# Patient Record
Sex: FEMALE | Race: WHITE | NOT HISPANIC OR LATINO | Employment: STUDENT | URBAN - METROPOLITAN AREA
[De-identification: names, ages, dates, MRNs, and addresses within clinical notes are randomized per-mention and may not be internally consistent; named-entity substitution may affect disease eponyms.]

---
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
## 2023-01-10 ENCOUNTER — PATIENT MESSAGE (OUTPATIENT)
Dept: OTOLARYNGOLOGY | Facility: CLINIC | Age: 18
End: 2023-01-10
Payer: COMMERCIAL

## 2023-05-15 ENCOUNTER — PATIENT MESSAGE (OUTPATIENT)
Dept: OBSTETRICS AND GYNECOLOGY | Facility: CLINIC | Age: 18
End: 2023-05-15

## 2023-09-06 ENCOUNTER — PATIENT MESSAGE (OUTPATIENT)
Dept: OBSTETRICS AND GYNECOLOGY | Facility: CLINIC | Age: 18
End: 2023-09-06
Payer: COMMERCIAL

## 2023-09-06 DIAGNOSIS — N92.0 MENORRHAGIA WITH REGULAR CYCLE: ICD-10-CM

## 2023-09-06 RX ORDER — ETHYNODIOL DIACETATE AND ETHINYL ESTRADIOL 1 MG-50MCG
1 KIT ORAL DAILY
Qty: 84 TABLET | Refills: 3 | Status: CANCELLED | OUTPATIENT
Start: 2023-09-06 | End: 2024-09-05

## 2023-09-07 RX ORDER — ETHYNODIOL DIACETATE AND ETHINYL ESTRADIOL 1 MG-50MCG
1 KIT ORAL DAILY
Qty: 84 TABLET | Refills: 3 | Status: SHIPPED | OUTPATIENT
Start: 2023-09-07 | End: 2023-09-11 | Stop reason: SDUPTHER

## 2023-09-11 ENCOUNTER — OFFICE VISIT (OUTPATIENT)
Dept: OBSTETRICS AND GYNECOLOGY | Facility: CLINIC | Age: 18
End: 2023-09-11
Payer: COMMERCIAL

## 2023-09-11 VITALS
SYSTOLIC BLOOD PRESSURE: 110 MMHG | WEIGHT: 122.13 LBS | DIASTOLIC BLOOD PRESSURE: 70 MMHG | HEIGHT: 63 IN | BODY MASS INDEX: 21.64 KG/M2

## 2023-09-11 DIAGNOSIS — N92.0 MENORRHAGIA WITH REGULAR CYCLE: ICD-10-CM

## 2023-09-11 DIAGNOSIS — Z11.3 SCREEN FOR STD (SEXUALLY TRANSMITTED DISEASE): Primary | ICD-10-CM

## 2023-09-11 PROCEDURE — 99213 OFFICE O/P EST LOW 20 MIN: CPT | Mod: S$GLB,,, | Performed by: OBSTETRICS & GYNECOLOGY

## 2023-09-11 PROCEDURE — 3074F SYST BP LT 130 MM HG: CPT | Mod: CPTII,S$GLB,, | Performed by: OBSTETRICS & GYNECOLOGY

## 2023-09-11 PROCEDURE — 1160F PR REVIEW ALL MEDS BY PRESCRIBER/CLIN PHARMACIST DOCUMENTED: ICD-10-PCS | Mod: CPTII,S$GLB,, | Performed by: OBSTETRICS & GYNECOLOGY

## 2023-09-11 PROCEDURE — 3078F PR MOST RECENT DIASTOLIC BLOOD PRESSURE < 80 MM HG: ICD-10-PCS | Mod: CPTII,S$GLB,, | Performed by: OBSTETRICS & GYNECOLOGY

## 2023-09-11 PROCEDURE — 99213 PR OFFICE/OUTPT VISIT, EST, LEVL III, 20-29 MIN: ICD-10-PCS | Mod: S$GLB,,, | Performed by: OBSTETRICS & GYNECOLOGY

## 2023-09-11 PROCEDURE — 87591 N.GONORRHOEAE DNA AMP PROB: CPT | Performed by: OBSTETRICS & GYNECOLOGY

## 2023-09-11 PROCEDURE — 1160F RVW MEDS BY RX/DR IN RCRD: CPT | Mod: CPTII,S$GLB,, | Performed by: OBSTETRICS & GYNECOLOGY

## 2023-09-11 PROCEDURE — 99999 PR PBB SHADOW E&M-EST. PATIENT-LVL III: ICD-10-PCS | Mod: PBBFAC,,, | Performed by: OBSTETRICS & GYNECOLOGY

## 2023-09-11 PROCEDURE — 3078F DIAST BP <80 MM HG: CPT | Mod: CPTII,S$GLB,, | Performed by: OBSTETRICS & GYNECOLOGY

## 2023-09-11 PROCEDURE — 99999 PR PBB SHADOW E&M-EST. PATIENT-LVL III: CPT | Mod: PBBFAC,,, | Performed by: OBSTETRICS & GYNECOLOGY

## 2023-09-11 PROCEDURE — 3074F PR MOST RECENT SYSTOLIC BLOOD PRESSURE < 130 MM HG: ICD-10-PCS | Mod: CPTII,S$GLB,, | Performed by: OBSTETRICS & GYNECOLOGY

## 2023-09-11 PROCEDURE — 1159F PR MEDICATION LIST DOCUMENTED IN MEDICAL RECORD: ICD-10-PCS | Mod: CPTII,S$GLB,, | Performed by: OBSTETRICS & GYNECOLOGY

## 2023-09-11 PROCEDURE — 3008F PR BODY MASS INDEX (BMI) DOCUMENTED: ICD-10-PCS | Mod: CPTII,S$GLB,, | Performed by: OBSTETRICS & GYNECOLOGY

## 2023-09-11 PROCEDURE — 1159F MED LIST DOCD IN RCRD: CPT | Mod: CPTII,S$GLB,, | Performed by: OBSTETRICS & GYNECOLOGY

## 2023-09-11 PROCEDURE — 3008F BODY MASS INDEX DOCD: CPT | Mod: CPTII,S$GLB,, | Performed by: OBSTETRICS & GYNECOLOGY

## 2023-09-11 RX ORDER — ETHYNODIOL DIACETATE AND ETHINYL ESTRADIOL 1 MG-50MCG
1 KIT ORAL DAILY
Qty: 84 TABLET | Refills: 3 | Status: SHIPPED | OUTPATIENT
Start: 2023-09-11 | End: 2024-09-10

## 2023-09-11 NOTE — PROGRESS NOTES
"Chief Complaint   Patient presents with    Well Woman       History of Present Illness   18 y.o.  female   patient presents today for Birth control refill, no contraindications, no complaints, no bowel or bladder complaints.     Past medical and surgical history reviewed.   I have reviewed the patient's medical history in detail and updated the computerized patient record.    Review of patient's allergies indicates:   Allergen Reactions    Cephalosporins Hives         Review of Systems -   GEN ROS:   Breast ROS:   Genito-Urinary ROS:       Physical Examination:  /70   Ht 5' 3" (1.6 m)   Wt 55.4 kg (122 lb 2.2 oz)   LMP 2023 (Approximate)   BMI 21.64 kg/m²    deferred      Assessment:  1. Screen for STD (sexually transmitted disease)  C. trachomatis/N. gonorrhoeae by AMP DNA [facility]; Urine      2. Menorrhagia with regular cycle  ethynodiol-ethinyl estradiol (KELNOR 1-50, 28,) 1-50 mg-mcg per tablet          Plan:  PAP next year, refill oral contraceptive pills - no contraindications  Patient informed will be contacted with results within 2 weeks. Encouraged to please call back or email if she has not heard from us by then.          "

## 2023-09-12 LAB
C TRACH DNA SPEC QL NAA+PROBE: NOT DETECTED
N GONORRHOEA DNA SPEC QL NAA+PROBE: NOT DETECTED

## 2024-05-13 ENCOUNTER — OFFICE VISIT (OUTPATIENT)
Dept: OTOLARYNGOLOGY | Facility: CLINIC | Age: 19
End: 2024-05-13
Payer: COMMERCIAL

## 2024-05-13 VITALS — BODY MASS INDEX: 22.54 KG/M2 | HEIGHT: 63 IN | WEIGHT: 127.19 LBS

## 2024-05-13 DIAGNOSIS — T70.0XXS OTITIC BAROTRAUMA, SEQUELA: ICD-10-CM

## 2024-05-13 DIAGNOSIS — H69.03 PATULOUS EUSTACHIAN TUBE OF BOTH EARS: Primary | ICD-10-CM

## 2024-05-13 PROCEDURE — 1159F MED LIST DOCD IN RCRD: CPT | Mod: CPTII,S$GLB,, | Performed by: OTOLARYNGOLOGY

## 2024-05-13 PROCEDURE — 99999 PR PBB SHADOW E&M-EST. PATIENT-LVL III: CPT | Mod: PBBFAC,,, | Performed by: OTOLARYNGOLOGY

## 2024-05-13 PROCEDURE — 3008F BODY MASS INDEX DOCD: CPT | Mod: CPTII,S$GLB,, | Performed by: OTOLARYNGOLOGY

## 2024-05-13 PROCEDURE — 99214 OFFICE O/P EST MOD 30 MIN: CPT | Mod: S$GLB,,, | Performed by: OTOLARYNGOLOGY

## 2024-05-13 PROCEDURE — 1160F RVW MEDS BY RX/DR IN RCRD: CPT | Mod: CPTII,S$GLB,, | Performed by: OTOLARYNGOLOGY

## 2024-05-13 NOTE — PROGRESS NOTES
Subjective:       Patient ID: Albina Dumont is a 18 y.o. female.    Chief Complaint: Ear Fullness    Albina is here for follow-up.   Here today for ET issues. She has had both Patulous and obstructive ET in the past. She continues to have bothersome autophony / fullness. This is intermittent but at times seems to worsen for extended periods. No otorrhea. She also has problems with flying - extreme pain and pressure. Has not taken any prophylactic.   Last visit 8/2022      Patient validated questionnaires (if applicable):      %            No data to display                   No data to display                   No data to display                     Review of Systems   Constitutional: Negative for activity change and appetite change.   Respiratory: Negative for difficulty breathing and wheezing   Cardiovascular: Negative for chest pain.      Objective:        Constitutional:   Vital signs are normal. She appears well-developed and well-nourished.     Head:  Normocephalic and atraumatic.     Ears:  Hearing normal to normal and whispered voice; external ear normal without scars, lesions, or masses; ear canal, tympanic membrane, and middle ear normal..   Excursion of posterior tympanic membrane L>R with nasal breathing.     Nose:  Nose normal including turbinates, nasal mucosa, sinuses and nasal septum.     Mouth/Throat  Oropharynx clear and moist without lesions or asymmetry.     Neck:  Neck normal without thyromegaly masses, asymmetry, normal tracheal structure, crepitus, and tenderness.         Tests / Results:  none    Assessment:       1. Patulous eustachian tube of both ears    2. Otitic barotrauma, sequela          Plan:       Discussed primarily Patulous ET with what seems to be obstruction ETD with barometric changes.   When flying, discussed decongestant prior to flight.  Otherwise, when not flying, avoid decongestants / flonase / AH unless needed for nasal symptoms. We discussed TM weighing to see if  improved symptoms.   Topical lidocaine applied to left TM, followed by small mastisol coated steri strip to weigh down the posterior tympanic membrane. Left right alone so we could trend R vs. L.  She will update me with progress

## 2024-05-18 ENCOUNTER — PATIENT MESSAGE (OUTPATIENT)
Dept: OTOLARYNGOLOGY | Facility: CLINIC | Age: 19
End: 2024-05-18
Payer: COMMERCIAL

## 2024-07-10 DIAGNOSIS — M25.511 BILATERAL SHOULDER PAIN: Primary | ICD-10-CM

## 2024-07-10 DIAGNOSIS — M25.512 BILATERAL SHOULDER PAIN: Primary | ICD-10-CM

## 2024-07-11 ENCOUNTER — HOSPITAL ENCOUNTER (OUTPATIENT)
Dept: RADIOLOGY | Facility: HOSPITAL | Age: 19
Discharge: HOME | End: 2024-07-11
Attending: ORTHOPAEDIC SURGERY
Payer: COMMERCIAL

## 2024-07-11 ENCOUNTER — OFFICE VISIT (OUTPATIENT)
Dept: ORTHOPEDICS | Facility: CLINIC | Age: 19
End: 2024-07-11
Payer: COMMERCIAL

## 2024-07-11 VITALS — HEIGHT: 63 IN | BODY MASS INDEX: 22.15 KG/M2 | WEIGHT: 125 LBS

## 2024-07-11 DIAGNOSIS — M25.512 BILATERAL SHOULDER PAIN, UNSPECIFIED CHRONICITY: Primary | ICD-10-CM

## 2024-07-11 DIAGNOSIS — M25.511 BILATERAL SHOULDER PAIN, UNSPECIFIED CHRONICITY: Primary | ICD-10-CM

## 2024-07-11 DIAGNOSIS — M25.511 BILATERAL SHOULDER PAIN: ICD-10-CM

## 2024-07-11 DIAGNOSIS — M25.512 BILATERAL SHOULDER PAIN: ICD-10-CM

## 2024-07-11 PROCEDURE — 1159F MED LIST DOCD IN RCRD: CPT | Mod: CPTII,S$GLB,, | Performed by: ORTHOPAEDIC SURGERY

## 2024-07-11 PROCEDURE — 99203 OFFICE O/P NEW LOW 30 MIN: CPT | Mod: S$GLB,,, | Performed by: ORTHOPAEDIC SURGERY

## 2024-07-11 PROCEDURE — 3008F BODY MASS INDEX DOCD: CPT | Mod: CPTII,S$GLB,, | Performed by: ORTHOPAEDIC SURGERY

## 2024-07-11 PROCEDURE — 73030 X-RAY EXAM OF SHOULDER: CPT | Mod: 26,50,, | Performed by: RADIOLOGY

## 2024-07-11 PROCEDURE — 73030 X-RAY EXAM OF SHOULDER: CPT | Mod: TC,50,PO

## 2024-07-11 PROCEDURE — 99999 PR PBB SHADOW E&M-EST. PATIENT-LVL II: CPT | Mod: PBBFAC,,, | Performed by: ORTHOPAEDIC SURGERY

## 2024-07-11 NOTE — PROGRESS NOTES
18 years old left greater than right shoulder pain for at least 6 months time no trauma throbbing pain 6 on good days 7 on bad days.  Difficulty put her arm up overhead reaching behind her back also pain nighttime she sleeps on the side.  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

## 2024-07-17 ENCOUNTER — PATIENT MESSAGE (OUTPATIENT)
Dept: ORTHOPEDICS | Facility: CLINIC | Age: 19
End: 2024-07-17
Payer: COMMERCIAL

## 2024-07-18 DIAGNOSIS — F41.9 ANXIETY: Primary | ICD-10-CM

## 2024-07-18 RX ORDER — DIAZEPAM 5 MG/1
5 TABLET ORAL EVERY 6 HOURS PRN
Qty: 2 TABLET | Refills: 0 | Status: SHIPPED | OUTPATIENT
Start: 2024-07-18 | End: 2024-08-17

## 2024-07-22 ENCOUNTER — PATIENT MESSAGE (OUTPATIENT)
Dept: ORTHOPEDICS | Facility: CLINIC | Age: 19
End: 2024-07-22
Payer: COMMERCIAL

## 2024-07-29 ENCOUNTER — PATIENT MESSAGE (OUTPATIENT)
Dept: ORTHOPEDICS | Facility: CLINIC | Age: 19
End: 2024-07-29
Payer: COMMERCIAL

## 2024-07-31 ENCOUNTER — PATIENT MESSAGE (OUTPATIENT)
Dept: ORTHOPEDICS | Facility: CLINIC | Age: 19
End: 2024-07-31
Payer: COMMERCIAL

## 2024-07-31 DIAGNOSIS — M25.511 BILATERAL SHOULDER PAIN: Primary | ICD-10-CM

## 2024-07-31 DIAGNOSIS — M25.512 BILATERAL SHOULDER PAIN: Primary | ICD-10-CM

## 2024-07-31 RX ORDER — CELECOXIB 200 MG/1
200 CAPSULE ORAL DAILY
Qty: 30 CAPSULE | Refills: 0 | Status: SHIPPED | OUTPATIENT
Start: 2024-07-31

## 2024-09-18 ENCOUNTER — OFFICE VISIT (OUTPATIENT)
Dept: OBSTETRICS AND GYNECOLOGY | Facility: CLINIC | Age: 19
End: 2024-09-18
Payer: COMMERCIAL

## 2024-09-18 VITALS
DIASTOLIC BLOOD PRESSURE: 78 MMHG | WEIGHT: 130.31 LBS | SYSTOLIC BLOOD PRESSURE: 124 MMHG | HEIGHT: 63 IN | BODY MASS INDEX: 23.09 KG/M2

## 2024-09-18 DIAGNOSIS — Z01.419 GYNECOLOGIC EXAM NORMAL: Primary | ICD-10-CM

## 2024-09-18 DIAGNOSIS — N92.0 MENORRHAGIA WITH REGULAR CYCLE: ICD-10-CM

## 2024-09-18 PROCEDURE — 3074F SYST BP LT 130 MM HG: CPT | Mod: CPTII,S$GLB,, | Performed by: OBSTETRICS & GYNECOLOGY

## 2024-09-18 PROCEDURE — 3008F BODY MASS INDEX DOCD: CPT | Mod: CPTII,S$GLB,, | Performed by: OBSTETRICS & GYNECOLOGY

## 2024-09-18 PROCEDURE — 99395 PREV VISIT EST AGE 18-39: CPT | Mod: S$GLB,,, | Performed by: OBSTETRICS & GYNECOLOGY

## 2024-09-18 PROCEDURE — 3078F DIAST BP <80 MM HG: CPT | Mod: CPTII,S$GLB,, | Performed by: OBSTETRICS & GYNECOLOGY

## 2024-09-18 PROCEDURE — 99999 PR PBB SHADOW E&M-EST. PATIENT-LVL III: CPT | Mod: PBBFAC,,, | Performed by: OBSTETRICS & GYNECOLOGY

## 2024-09-18 PROCEDURE — 1159F MED LIST DOCD IN RCRD: CPT | Mod: CPTII,S$GLB,, | Performed by: OBSTETRICS & GYNECOLOGY

## 2024-09-18 RX ORDER — ETHYNODIOL DIACETATE AND ETHINYL ESTRADIOL 1 MG-50MCG
1 KIT ORAL DAILY
Qty: 84 TABLET | Refills: 3 | Status: SHIPPED | OUTPATIENT
Start: 2024-09-18 | End: 2025-09-18

## 2024-09-18 NOTE — PROGRESS NOTES
Chief Complaint   Patient presents with    Well Woman       History and Physical:  Patient's last menstrual period was 2024 (approximate).       Albina Dumont is a 19 y.o.   female who presents today for her routine annual GYN exam. The patient has no Gynecology complaints today. No bowel or bladder complaints.       Allergies:   Review of patient's allergies indicates:   Allergen Reactions    Cephalosporins Hives       Past Medical History:   Diagnosis Date    Seasonal allergies        Past Surgical History:   Procedure Laterality Date    MOLE REMOVAL      left thigh    TONSILLECTOMY      TYMPANOSTOMY TUBE PLACEMENT      bilateral       MEDS:   Current Outpatient Medications on File Prior to Visit   Medication Sig Dispense Refill    ethynodiol-ethinyl estradiol (KELNOR 1-50, 28,) 1-50 mg-mcg per tablet Take 1 tablet by mouth once daily. 84 tablet 3    celecoxib (CELEBREX) 200 MG capsule Take 1 capsule (200 mg total) by mouth once daily. (Patient not taking: Reported on 2024) 30 capsule 0    diazePAM (VALIUM) 5 MG tablet Take 30 minutes before MRI appointment   Don't operate heavy machinery while taking this medication. (Patient not taking: Reported on 2024) 2 tablet 0     No current facility-administered medications on file prior to visit.       OB History          0    Para   0    Term   0       0    AB   0    Living   0         SAB   0    IAB   0    Ectopic   0    Multiple   0    Live Births   0                 Social History     Socioeconomic History    Marital status: Single   Tobacco Use    Smoking status: Never    Smokeless tobacco: Never   Substance and Sexual Activity    Alcohol use: No    Drug use: No    Sexual activity: Never     Social Determinants of Health     Financial Resource Strain: Low Risk  (2024)    Overall Financial Resource Strain (CARDIA)     Difficulty of Paying Living Expenses: Not hard at all   Food Insecurity: No Food Insecurity  "(7/28/2024)    Hunger Vital Sign     Worried About Running Out of Food in the Last Year: Never true     Ran Out of Food in the Last Year: Never true   Transportation Needs: No Transportation Needs (5/12/2024)    PRAPARE - Transportation     Lack of Transportation (Medical): No     Lack of Transportation (Non-Medical): No   Physical Activity: Insufficiently Active (7/28/2024)    Exercise Vital Sign     Days of Exercise per Week: 2 days     Minutes of Exercise per Session: 20 min   Stress: No Stress Concern Present (7/28/2024)    [employer] [place] [employer] - Occupational Stress Questionnaire     Feeling of Stress : Only a little   Housing Stability: Unknown (7/28/2024)    Housing Stability Vital Sign     Unable to Pay for Housing in the Last Year: No       No family history on file.      Past medical and surgical history reviewed.   I have reviewed the patient's medical history in detail and updated the computerized patient record.    Review of System:   General: no chills, fever, night sweats, weight gain or weight loss  Psychological: no depression or suicidal ideation  Breasts: no new or changing breast lumps, nipple discharge or masses.  Respiratory: no cough, shortness of breath, or wheezing  Cardiovascular: no chest pain or dyspnea on exertion  Gastrointestinal: no abdominal pain, change in bowel habits, or black or bloody stools  Genito-Urinary: no incontinence, urinary frequency/urgency or vulvar/vaginal symptoms, pelvic pain or abnormal vaginal bleeding.  Musculoskeletal: no gait disturbance or muscular weakness      Physical Exam:   /78   Ht 5' 3" (1.6 m)   Wt 59.1 kg (130 lb 4.7 oz)   LMP 09/03/2024 (Approximate)   BMI 23.08 kg/m²   Constitutional: She appears alert and responsive. She appears well-developed, well-groomed, and well-nourished. No distress. Thin  HENT:   Head: Normocephalic and atraumatic.   Eyes: Conjunctivae and EOM are normal. No scleral icterus.   Neck: " Symmetrical. Normal range of motion. Neck supple. No tracheal deviation present.   Cardiovascular: Normal rate, no rhythm abnormality noted. Extremities without swelling or edema, warm.    Pulmonary/Chest: Normal respiratory Effort. No distress or retractions. She exhibits no tenderness.  Breasts: are symmetrical.   Right breast exhibits no inverted nipple, no mass, no nipple discharge, no skin change and no tenderness.   Left breast exhibits no inverted nipple, no mass, no nipple discharge, no skin change and no tenderness.  Abdominal: Soft. She exhibits no distension, hernias or masses. There is no tenderness. No enlargement of liver edge or spleen.  There is no rebound and no guarding.   Genitourinary:    External rectal exam shows no thrombosed external hemorrhoids, no lesions.     Pelvic exam was performed with patient supine.   No labial fusion, and symmetrical.    There is no rash, lesion or injury on the right labia.   There is no rash, lesion or injury on the left labia.   No bleeding and no signs of injury around the vaginal introitus, urethral meatus is normal size and without prolapse or lesions, urethra well supported. The cervix is visualized with no discharge, lesions or friability.   No vaginal discharge found.    No significant Cystocele, Enterocele or rectocele, and cervix and uterus well supported.   Bimanual exam:   The urethra is normal to palpation and there are no palpable vaginal wall masses.   Uterus is not deviated, not enlarged, not fixed, normal shape and not tender.   Cervix exhibits no motion tenderness.    Right adnexum displays no mass or nodularity and no tenderness.   Left adnexum displays no mass or nodularity and no tenderness.  Musculoskeletal: Normal range of motion.   Neurological: She is alert and oriented to person, place, and time. Coordination normal.   Skin: Skin is warm and dry. She is not diaphoretic. No rashes, lesions or ulcers.   Psychiatric: She has a normal mood and  affect, oriented to person, place, and time.      Assessment:   Normal annual GYN exam  1. Gynecologic exam normal  Liquid-Based Pap Smear, Screening    HPV High Risk Genotypes, PCR          Plan:   PAP  Refill oral contraceptive pills   Follow up in 1 year.  Patient informed will be contacted with results within 2 weeks. Encouraged to please call back or email if she has not heard from us by then.

## 2024-09-28 ENCOUNTER — PATIENT MESSAGE (OUTPATIENT)
Dept: OBSTETRICS AND GYNECOLOGY | Facility: CLINIC | Age: 19
End: 2024-09-28
Payer: COMMERCIAL

## 2025-01-03 ENCOUNTER — OFFICE VISIT (OUTPATIENT)
Dept: ORTHOPEDICS | Facility: CLINIC | Age: 20
End: 2025-01-03
Payer: COMMERCIAL

## 2025-01-03 DIAGNOSIS — M25.511 BILATERAL SHOULDER PAIN, UNSPECIFIED CHRONICITY: Primary | ICD-10-CM

## 2025-01-03 DIAGNOSIS — M25.512 BILATERAL SHOULDER PAIN, UNSPECIFIED CHRONICITY: Primary | ICD-10-CM

## 2025-01-03 DIAGNOSIS — M25.312 INSTABILITY OF BOTH SHOULDER JOINTS: ICD-10-CM

## 2025-01-03 DIAGNOSIS — M25.311 INSTABILITY OF BOTH SHOULDER JOINTS: ICD-10-CM

## 2025-01-03 PROBLEM — Z00.01 ABNORMAL WELLNESS EXAM: Status: ACTIVE | Noted: 2025-01-03

## 2025-01-03 PROBLEM — J00 ACUTE NASOPHARYNGITIS: Status: ACTIVE | Noted: 2025-01-03

## 2025-01-03 PROCEDURE — 99999 PR PBB SHADOW E&M-EST. PATIENT-LVL III: CPT | Mod: PBBFAC,,, | Performed by: ORTHOPAEDIC SURGERY

## 2025-01-03 NOTE — PROGRESS NOTES
19 years old follow-up bilateral shoulder pain.  Recently had an MRI outlying facility which shows some signal changes in the labrum, rotator cuff intact    At this point she is still remains symptomatic.  We discussed with her Kenalog injection to provide some immediate relief with physical therapy focusing on strengthening of the dynamic stabilizers of the shoulder as the primary form of treatment

## 2025-01-06 PROBLEM — E78.2 MIXED HYPERLIPIDEMIA: Status: ACTIVE | Noted: 2025-01-06

## 2025-01-07 ENCOUNTER — CLINICAL SUPPORT (OUTPATIENT)
Dept: REHABILITATION | Facility: HOSPITAL | Age: 20
End: 2025-01-07
Payer: COMMERCIAL

## 2025-01-07 DIAGNOSIS — M25.511 BILATERAL SHOULDER PAIN, UNSPECIFIED CHRONICITY: ICD-10-CM

## 2025-01-07 DIAGNOSIS — M25.512 BILATERAL SHOULDER PAIN, UNSPECIFIED CHRONICITY: ICD-10-CM

## 2025-01-07 DIAGNOSIS — R29.898 SHOULDER WEAKNESS: Primary | ICD-10-CM

## 2025-01-07 DIAGNOSIS — M25.312 INSTABILITY OF BOTH SHOULDER JOINTS: ICD-10-CM

## 2025-01-07 DIAGNOSIS — M25.311 INSTABILITY OF BOTH SHOULDER JOINTS: ICD-10-CM

## 2025-01-07 PROCEDURE — 97110 THERAPEUTIC EXERCISES: CPT | Mod: PO

## 2025-01-07 PROCEDURE — 97161 PT EVAL LOW COMPLEX 20 MIN: CPT | Mod: PO

## 2025-01-07 NOTE — PLAN OF CARE
[facility]   Physical Therapy Initial Evaluation      Date: 1/7/2025   Name: Albina Dumont  Clinic Number: [id number]    Therapy Diagnosis:  Encounter Diagnoses   Name Primary?    Bilateral shoulder pain, unspecified chronicity     Instability of both shoulder joints     Shoulder weakness Yes      Physician: Eddie Ayala MD     Physician Orders: PT Eval and Treat  Medical Diagnosis from Referral: Bilateral shoulder pain, unspecified chronicity [M25.511, M25.512], Instability of both shoulder joints [M25.311, M25.312]   Evaluation Date: 1/7/2025  Authorization Period Expiration: 01/03/2026   Plan of Care Expiration: 3/7/2025  Progress Note Due: 2/7/2025  Visit # / Visits authorized: 1/ 1   FOTO: 1/ 3     Precautions: Standard    Time In: 11:00 am  Time Out: 12:00 pm  Total Billable Time (timed & untimed codes): 60 minutes    SUBJECTIVE   Date of onset: 1 year ago    History of current condition - Albina reports: She has been dealing with B shoulder pain R>L for about a year now. Pt denies any ROHINI, states the pain developed gradually. Pain is worse in the morning and with overhead activities. Pt reports occasional clicking in the shoulders bilaterally. Pt played soccer, cheer, and track in high school but never had any issues. Pt denies any neck pain or radiating arm pain. 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 MRI showed signal changes in labrum.    Imaging: MRI B shoulders (see chart)    Pain:  Current 2/10, worst 6/10, best 1/10   Location: B superior shoulders   Description: Aching  Aggravating Factors: Lifting, reaching behind back   Easing Factors: pain medication    Prior Therapy: N/A  Social History: Pt lives with their family  Occupation: [employer] student   Prior Level of Function: Independent and pain free with all ADL, IADL, community mobility and functional activities.   Current Level of Function: Patient experiences Moderate difficulty with  participation in their typical ADL's.     Dominant Extremity: right    Pts goals: Pt reported goals are to decrease overall pain levels in order to return to prior functional level.       Medical History:   Past Medical History:   Diagnosis Date    Seasonal allergies        Surgical History:   Albina Dumont  has a past surgical history that includes Tonsillectomy; Tympanostomy tube placement; and Mole removal.    Medications:   Albina has a current medication list which includes the following prescription(s): ethynodiol-ethinyl estradiol.    Allergies:   Review of patient's allergies indicates:   Allergen Reactions    Cephalosporins Hives        OBJECTIVE     RANGE OF MOTION:  *denotes pain     Cervical ROM  (Degrees) Right    Left    Goal   Cervical Flexion  60 60   Cervical Extension  60 60   Cervical Side Bending  45 45 45   Cervical Rotation  90 90 90       Shoulder ROM  (Degrees) Right Left Goal   Shoulder Flexion 130*/180 140/180 160   Shoulder Extension  60/60 60/60 60   Shoulder Abduction  120*/180 130/180 160   Shoulder ER  90/100 90/100 90   Shoulder IR  70/75 70/75 70     Joint Mobility: Increased anterior glide bilaterally    Mobility:   Lat - R: + L: -  Horizontal ADD - R: + L: -     STRENGTH:  *denotes pain     MMT Right Left Goal   Shoulder Flexion 3+/5* 3+/5* 5/5   Shoulder Abduction 3+/5* 3+/5* 5/5   Shoulder IR 5/5 5/5 5/5   Shoulder ER 4-/5* 4-/5* 5/5   Rhomboid 5/5 5/5 5/5   Lower Trapezius  3/5* 3/5* 5/5   Middle Trapezius 3/5* 3/5* 5/5   Elbow Flexion  5/5 5/5 5/5   Elbow Extension 5/5 5/5 5/5   Wrist Flexion 5/5 5/5 5/5    Wrist Extension 5/5 5/5 5/5         SPECIAL TESTS:     Right  (spine) Left    HK Positive Negative   Active compression  Positive Positive   Apprehension  Positive for pain Positive for pain   Job's relocation Positive Negative   Dynamic speeds Negative Negative       Initial Observation: Depressed R shoulder, decreased R upward rotation     Sensation:  Sensation is intact  to light touch in B upper extremities      FUNCTION:     Intake Outcome Measure for FOTO Shoulder Survey    Therapist reviewed FOTO scores for Albina Dumont on 1/7/2025.   FOTO report - see Media section or FOTO account episode details.    Intake Score: 51%         TREATMENT         MANUAL THERAPY TECHNIQUES to improve pain, ROM for (00) minutes including:  Intervention Performed Today                                 THERAPEUTIC EXERCISES to develop strength, endurance, ROM, flexibility, posture, and core stabilization for (20) minutes including:  Intervention Performed Today     90/90 IR walkout x X10 ea. red   Wall slide x X10    Ulnar nerve glide x X15 ea.    carry x X2 laps 5lb         PATIENT EDUCATION AND HOME EXERCISES     Education provided: (5) minutes  HEP    Written Home Exercises Provided: {Exercises were reviewed and Albina was able to demonstrate them prior to the end of the session.  Albina demonstrated good  understanding of the education provided. See EMR under Patient Instructions for exercises provided during therapy sessions.    ASSESSMENT   Albina is a 19 y.o. female referred to outpatient Physical Therapy with a medical diagnosis of Bilateral shoulder pain, unspecified chronicity [M25.511, M25.512], Instability of both shoulder joints [M25.311, M25.312] . Pt presents with impairments including: impairments list: ROM, strength, endurance, joint mobility, muscle length, posture, and functional movement patterns.    Pt prognosis is Good.   Pt will benefit from skilled outpatient Physical Therapy to address the deficits stated above and in the chart below, provide pt/family education, and to maximize pt's level of independence.     Plan of care discussed with patient: Yes  Pt's spiritual, cultural and educational needs considered and patient is agreeable to the plan of care and goals as stated below:     Anticipated Barriers for therapy: occupation    Medical Necessity is demonstrated by the  following  History  Co-morbidities and personal factors that may impact the plan of care [x] LOW: no personal factors / co-morbidities  [] MODERATE: 1-2 personal factors / co-morbidities  [] HIGH: 3+ personal factors / co-morbidities    Moderate / High Support Documentation: See Past Medical History     Examination  Body Structures and Functions, activity limitations and participation restrictions that may impact the plan of care [] LOW: addressing 1-2 elements  [x] MODERATE: 3+ elements  [] HIGH: 4+ elements (please support below)    Moderate / High Support Documentation: See Evaluation Above     Clinical Presentation [x] LOW: stable  [] MODERATE: Evolving  [] HIGH: Unstable     Decision Making/ Complexity Score: low         GOALS:    Short Term Goals:  4 weeks Progress       Patient will report decreased pain to <3/10 at worst on VAS to progress toward Prior Level of Function. Progressing, not met   Patient will demonstrate pain free AROM in order to progress towards prior level of function.  Progressing, not met   Patient will improve all strength testing to 4/5 in order to progress towards Prior Level of Function.  Progressing, not met      Long Term Goals:  8 weeks Progress      Patient will report decreased pain to <0/10 at worst on VAS to progress toward Prior Level of Function.   Progressing, not met   Patient will demonstrate at least 95% LSI with HHD for scaption, flexion, and abduction in order to progress towards prior level of function.  Progressing, not met   Patient will improve ROM and Functional Mobility to stated goals to return to Prior Level of Function. Progressing, not met   Patient will perform regular work and school activities without shoulder instability or pain in order to return to prior level of function.  Progressing, not met       PLAN   Plan of care Certification: 1/7/2025 to 3/7/2025.    Outpatient Physical Therapy 2 times weekly for 8 weeks to include any combination of the  following interventions: virtual visits, dry needling, modalities, electrical stimulation (IFC, Pre-Mod, Attended with Functional Dry Needling), Manual Therapy, Neuromuscular Re-ed, Patient Education, Self Care, Therapeutic Exercise, and Therapeutic Activites     Juliocesar Rodriguez, PT, DPT      I CERTIFY THE NEED FOR THESE SERVICES FURNISHED UNDER THIS PLAN OF TREATMENT AND WHILE UNDER MY CARE   Physician's comments:     Physician's Signature: ___________________________________________________

## 2025-01-13 PROBLEM — J00 ACUTE NASOPHARYNGITIS: Status: RESOLVED | Noted: 2025-01-03 | Resolved: 2025-01-13

## 2025-01-13 PROBLEM — R29.898 SHOULDER WEAKNESS: Status: RESOLVED | Noted: 2025-01-07 | Resolved: 2025-01-13

## 2025-01-13 PROBLEM — N94.6 DYSMENORRHEA: Status: RESOLVED | Noted: 2018-05-23 | Resolved: 2025-01-13

## 2025-01-14 ENCOUNTER — CLINICAL SUPPORT (OUTPATIENT)
Dept: REHABILITATION | Facility: HOSPITAL | Age: 20
End: 2025-01-14
Payer: COMMERCIAL

## 2025-01-14 DIAGNOSIS — R29.898 SHOULDER WEAKNESS: Primary | ICD-10-CM

## 2025-01-14 PROCEDURE — 97112 NEUROMUSCULAR REEDUCATION: CPT | Mod: PO

## 2025-01-14 PROCEDURE — 97110 THERAPEUTIC EXERCISES: CPT | Mod: PO

## 2025-01-14 NOTE — PROGRESS NOTES
[facility]   Physical Therapy Treatment Note      Name: Albina Dumont  Clinic Number: [id number]    Therapy Diagnosis:   Encounter Diagnosis   Name Primary?    Shoulder weakness Yes     Physician: Eddie Ayala MD    Visit Date: 1/14/2025    Physician Orders: PT Eval and Treat  Medical Diagnosis from Referral: Bilateral shoulder pain, unspecified chronicity [M25.511, M25.512], Instability of both shoulder joints [M25.311, M25.312]   Evaluation Date: 1/7/2025  Authorization Period Expiration: 01/03/2026   Plan of Care Expiration: 3/7/2025  Progress Note Due: 2/7/2025  Visit # / Visits authorized: 1/20 +eval  FOTO: 1/3      Precautions: Standard     Time In: 11:00 am  Time Out: 12:00 pm  Total Billable Time: 60 minutes (1:1 PT/tech)     PTA Visit #: 0/5       Subjective     Patient reports: Doing pretty good today.  She was compliant with home exercise program.  Response to previous treatment: N/A  Functional change: In progress     Pain: 2/10  Location: B shoulders     Objective      Objective Measures updated at progress report unless specified.     Treatment     Albina received the treatments listed below:      therapeutic exercises to develop strength, endurance, ROM, flexibility, and posture for 15 minutes including:  Lat stretch 20sx4   Ulnar nerve glide x15 ea.   90/90 IR walkout 2x10 ea. red    manual therapy techniques: Joint mobilizations and Soft tissue Mobilization were applied to the: B shoulders for 00 minutes, including:    neuromuscular re-education activities to improve: Coordination, Kinesthetic, Sense, Proprioception, and Posture for 45 minutes. The following activities were included:  Quad rock back 3x10  Rhythmic stabs ER/IR, flex 90 3x30s ea.   Wall slide 3x10  Prone T 3x10  Prone Y 3x8  Wall alphabets x3 ea.   Shoulder taps on plinth 3x8 ea.   Landmine press 3x8 ea. 15#  Sled push x2 laps 55#    therapeutic activities to improve functional performance for 00  minutes, including:      Patient Education and Home Exercises       Education provided:   HEP review     Written Home Exercises Provided: Yes. Exercises were reviewed and Albina was able to demonstrate them prior to the end of the session.  Albina demonstrated good  understanding of the education provided. See Electronic Medical Record under Patient Instructions for exercises provided during therapy sessions    Assessment     Good tolerance to first treatment session today. Heavy emphasis on periscapular and RTC dynamic stability with good performance. Closed chain strengthening tolerated well with good loading. Will continue to progress stability training as tolerated.     Albina Is progressing well towards her goals.   Patient prognosis is Good.     Patient will continue to benefit from skilled outpatient physical therapy to address the deficits listed in the problem list box on initial evaluation, provide pt/family education and to maximize pt's level of independence in the home and community environment.     Patient's spiritual, cultural and educational needs considered and pt agreeable to plan of care and goals.     Anticipated barriers to physical therapy: Work schedule    Goals:   Short Term Goals:  4 weeks Progress       Patient will report decreased pain to <3/10 at worst on VAS to progress toward Prior Level of Function. Progressing, not met   Patient will demonstrate pain free AROM in order to progress towards prior level of function.  Progressing, not met   Patient will improve all strength testing to 4/5 in order to progress towards Prior Level of Function.  Progressing, not met      Long Term Goals:  8 weeks Progress      Patient will report decreased pain to <0/10 at worst on VAS to progress toward Prior Level of Function.   Progressing, not met   Patient will demonstrate at least 95% LSI with HHD for scaption, flexion, and abduction in order to progress towards prior level of function.  Progressing,  not met   Patient will improve ROM and Functional Mobility to stated goals to return to Prior Level of Function. Progressing, not met   Patient will perform regular work and school activities without shoulder instability or pain in order to return to prior level of function.  Progressing, not met       Plan     Plan of care Certification: 1/7/2025 to 3/7/2025.     Outpatient Physical Therapy 2 times weekly for 8 weeks to include any combination of the following interventions: virtual visits, dry needling, modalities, electrical stimulation (IFC, Pre-Mod, Attended with Functional Dry Needling), Manual Therapy, Neuromuscular Re-ed, Patient Education, Self Care, Therapeutic Exercise, and Therapeutic Activites        Juliocesar Rodriguez, PT

## 2025-01-16 ENCOUNTER — CLINICAL SUPPORT (OUTPATIENT)
Dept: REHABILITATION | Facility: HOSPITAL | Age: 20
End: 2025-01-16
Payer: COMMERCIAL

## 2025-01-16 DIAGNOSIS — R29.898 SHOULDER WEAKNESS: Primary | ICD-10-CM

## 2025-01-16 PROCEDURE — 97110 THERAPEUTIC EXERCISES: CPT | Mod: PO

## 2025-01-16 PROCEDURE — 97112 NEUROMUSCULAR REEDUCATION: CPT | Mod: PO

## 2025-01-16 NOTE — PROGRESS NOTES
[facility]   Physical Therapy Treatment Note      Name: Albina Dumont  Clinic Number: [id number]    Therapy Diagnosis:   Encounter Diagnosis   Name Primary?    Shoulder weakness Yes     Physician: Eddie Ayala MD    Visit Date: 1/16/2025    Physician Orders: PT Eval and Treat  Medical Diagnosis from Referral: Bilateral shoulder pain, unspecified chronicity [M25.511, M25.512], Instability of both shoulder joints [M25.311, M25.312]   Evaluation Date: 1/7/2025  Authorization Period Expiration: 01/03/2026   Plan of Care Expiration: 3/7/2025  Progress Note Due: 2/7/2025  Visit # / Visits authorized: 2/20 +eval  FOTO: 1/3      Precautions: Standard     Time In: 9:00 am  Time Out: 10:00 am  Total Billable Time: 60 minutes (1:1 PT/tech)     PTA Visit #: 0/5       Subjective     Patient reports: Felt good after last session.   She was compliant with home exercise program.  Response to previous treatment: N/A  Functional change: In progress     Pain: 2/10  Location: B shoulders     Objective      Objective Measures updated at progress report unless specified.     Treatment     Albina received the treatments listed below:      therapeutic exercises to develop strength, endurance, ROM, flexibility, and posture for 15 minutes including:  Lat stretch 20sx4   Ulnar nerve glide x15 ea.   90/90 IR walkout 2x10 ea. red    manual therapy techniques: Joint mobilizations and Soft tissue Mobilization were applied to the: B shoulders for 00 minutes, including:    neuromuscular re-education activities to improve: Coordination, Kinesthetic, Sense, Proprioception, and Posture for 45 minutes. The following activities were included:  Quad rock back 3x10  Rhythmic stabs ER/IR, flex 90 3x30s ea.   Wall slide 3x10 yellow band  Prone T 3x10 2#  Prone Y 3x8  Wall alphabets x3 ea.   Shoulder taps on plinth 3x8 ea.   Landmine press 3x8 ea. 15#  Sled push x2 laps 55#   carry x3 laps ea. 5#  90/90 ball taps 3x20  ea.1kg    therapeutic activities to improve functional performance for 00 minutes, including:      Patient Education and Home Exercises       Education provided:   HEP review     Written Home Exercises Provided: Yes. Exercises were reviewed and Albina was able to demonstrate them prior to the end of the session.  Albina demonstrated good  understanding of the education provided. See Electronic Medical Record under Patient Instructions for exercises provided during therapy sessions    Assessment     Session tolerated well today with no increase in symptoms. Continued progression in RTC and periscapular dynamic stability today. Pt demonstrates considerable difficulty with rhythmic stabilizations due to lack of RTC stability. Will continue to progress within pt tolerance.     Albina Is progressing well towards her goals.   Patient prognosis is Good.     Patient will continue to benefit from skilled outpatient physical therapy to address the deficits listed in the problem list box on initial evaluation, provide pt/family education and to maximize pt's level of independence in the home and community environment.     Patient's spiritual, cultural and educational needs considered and pt agreeable to plan of care and goals.     Anticipated barriers to physical therapy: Work schedule    Goals:   Short Term Goals:  4 weeks Progress       Patient will report decreased pain to <3/10 at worst on VAS to progress toward Prior Level of Function. Progressing, not met   Patient will demonstrate pain free AROM in order to progress towards prior level of function.  Progressing, not met   Patient will improve all strength testing to 4/5 in order to progress towards Prior Level of Function.  Progressing, not met      Long Term Goals:  8 weeks Progress      Patient will report decreased pain to <0/10 at worst on VAS to progress toward Prior Level of Function.   Progressing, not met   Patient will demonstrate at least 95% LSI with HHD for  scaption, flexion, and abduction in order to progress towards prior level of function.  Progressing, not met   Patient will improve ROM and Functional Mobility to stated goals to return to Prior Level of Function. Progressing, not met   Patient will perform regular work and school activities without shoulder instability or pain in order to return to prior level of function.  Progressing, not met       Plan     Plan of care Certification: 1/7/2025 to 3/7/2025.     Outpatient Physical Therapy 2 times weekly for 8 weeks to include any combination of the following interventions: virtual visits, dry needling, modalities, electrical stimulation (IFC, Pre-Mod, Attended with Functional Dry Needling), Manual Therapy, Neuromuscular Re-ed, Patient Education, Self Care, Therapeutic Exercise, and Therapeutic Activites        Juliocesar Rodriguez, PT

## 2025-01-24 ENCOUNTER — CLINICAL SUPPORT (OUTPATIENT)
Dept: REHABILITATION | Facility: HOSPITAL | Age: 20
End: 2025-01-24
Payer: COMMERCIAL

## 2025-01-24 DIAGNOSIS — R29.898 SHOULDER WEAKNESS: Primary | ICD-10-CM

## 2025-01-24 PROCEDURE — 97112 NEUROMUSCULAR REEDUCATION: CPT | Mod: PO

## 2025-01-24 PROCEDURE — 97110 THERAPEUTIC EXERCISES: CPT | Mod: PO

## 2025-01-24 NOTE — PROGRESS NOTES
[facility]   Physical Therapy Treatment Note      Name: Albina Dumont  Clinic Number: [id number]    Therapy Diagnosis:   No diagnosis found.    Physician: Eddie Ayala MD    Visit Date: 1/24/2025    Physician Orders: PT Eval and Treat  Medical Diagnosis from Referral: Bilateral shoulder pain, unspecified chronicity [M25.511, M25.512], Instability of both shoulder joints [M25.311, M25.312]   Evaluation Date: 1/7/2025  Authorization Period Expiration: 01/03/2026   Plan of Care Expiration: 3/7/2025  Progress Note Due: 2/7/2025  Visit # / Visits authorized: 2/20 +eval  FOTO: 1/3      Precautions: Standard     Time In: 9:00 am  Time Out: 10:00 am  Total Billable Time: 60 minutes (1:1 PT/tech)     PTA Visit #: 0/5       Subjective     Patient reports: Felt good after last session.   She was compliant with home exercise program.  Response to previous treatment: N/A  Functional change: In progress     Pain: 2/10  Location: B shoulders     Objective      Objective Measures updated at progress report unless specified.     Treatment     Albina received the treatments listed below:      therapeutic exercises to develop strength, endurance, ROM, flexibility, and posture for 15 minutes including:  Lat stretch 20sx4   Ulnar nerve glide x15 ea.   90/90 IR walkout 2x10 ea. red    manual therapy techniques: Joint mobilizations and Soft tissue Mobilization were applied to the: B shoulders for 00 minutes, including:    neuromuscular re-education activities to improve: Coordination, Kinesthetic, Sense, Proprioception, and Posture for 45 minutes. The following activities were included:  Quad rock back 3x10  Rhythmic stabs ER/IR, flex 90 3x30s ea.   Wall slide 3x10 yellow band  Prone T 3x10 2#  Prone Y 3x8  Wall alphabets x3 ea.   Shoulder taps on plinth 3x8 ea.   Landmine press 3x8 ea. 15#  Sled push x2 laps 55#   carry x3 laps ea. 5#  90/90 ball taps 3x20 ea.1kg    therapeutic activities to improve  functional performance for 00 minutes, including:      Patient Education and Home Exercises       Education provided:   HEP review     Written Home Exercises Provided: Yes. Exercises were reviewed and Albina was able to demonstrate them prior to the end of the session.  Albina demonstrated good  understanding of the education provided. See Electronic Medical Record under Patient Instructions for exercises provided during therapy sessions    Assessment     Session tolerated well today with no increase in symptoms. Continued progression in RTC and periscapular dynamic stability today. Pt demonstrates considerable difficulty with rhythmic stabilizations due to lack of RTC stability. Will continue to progress within pt tolerance.     Albina Is progressing well towards her goals.   Patient prognosis is Good.     Patient will continue to benefit from skilled outpatient physical therapy to address the deficits listed in the problem list box on initial evaluation, provide pt/family education and to maximize pt's level of independence in the home and community environment.     Patient's spiritual, cultural and educational needs considered and pt agreeable to plan of care and goals.     Anticipated barriers to physical therapy: Work schedule    Goals:   Short Term Goals:  4 weeks Progress       Patient will report decreased pain to <3/10 at worst on VAS to progress toward Prior Level of Function. Progressing, not met   Patient will demonstrate pain free AROM in order to progress towards prior level of function.  Progressing, not met   Patient will improve all strength testing to 4/5 in order to progress towards Prior Level of Function.  Progressing, not met      Long Term Goals:  8 weeks Progress      Patient will report decreased pain to <0/10 at worst on VAS to progress toward Prior Level of Function.   Progressing, not met   Patient will demonstrate at least 95% LSI with HHD for scaption, flexion, and abduction in order to  progress towards prior level of function.  Progressing, not met   Patient will improve ROM and Functional Mobility to stated goals to return to Prior Level of Function. Progressing, not met   Patient will perform regular work and school activities without shoulder instability or pain in order to return to prior level of function.  Progressing, not met       Plan     Plan of care Certification: 1/7/2025 to 3/7/2025.     Outpatient Physical Therapy 2 times weekly for 8 weeks to include any combination of the following interventions: virtual visits, dry needling, modalities, electrical stimulation (IFC, Pre-Mod, Attended with Functional Dry Needling), Manual Therapy, Neuromuscular Re-ed, Patient Education, Self Care, Therapeutic Exercise, and Therapeutic Activites        Modesto Gastelum, PT, DPT

## 2025-01-24 NOTE — PROGRESS NOTES
"  Outpatient Rehab    Physical Therapy Visit    Patient Name: Albina Dumont  MRN: [id number]  YOB: 2005  Today's Date: 1/24/2025    Therapy Diagnosis:   Encounter Diagnosis   Name Primary?    Shoulder weakness Yes     Physician: Eddie Ayala MD    Physician Orders: Eval and Treat  Medical Diagnosis: Bilateral shoulder pain, unspecified chronicity [M25.511, M25.512], Instability of both shoulder joints     Visit # / Visits Authorized:  3 / 20   Date of Evaluation:  1/7/2025  Insurance Authorization Period: 1/3/2025 to 12/31/2025  Plan of Care Certification:  1/7/25 to 3/725      Time In:1005   Time Out: 1100   Total time: 55         Subjective   Pain with overhead motion, reaching behind back, and sleeping directly on R shoulder. "Wall slides after a while hurt too".  Pain reported as 2. R shoulder with active flexion    Past Medical History/Physical Systems Review:   Albina Dumont  has a past medical history of Seasonal allergies.    Albina Dumont  has a past surgical history that includes Tonsillectomy; Tympanostomy tube placement; and Mole removal.    Albina has a current medication list which includes the following prescription(s): ethynodiol-ethinyl estradiol.    Review of patient's allergies indicates:   Allergen Reactions    Cephalosporins Hives        Objective            Treatment:  Therapeutic Exercise  Therapeutic Exercise Activity 1: median n glide x20 ea    Balance/Neuromuscular Re-Education  Balance/Neuromuscular Re-Education Activity 1: RTB no money 3x12  Balance/Neuromuscular Re-Education Activity 2: RTB band pull apart x30  Balance/Neuromuscular Re-Education Activity 3: prone y 2x20 ea  Balance/Neuromuscular Re-Education Activity 4: prone t 2x20 ea 2#  Balance/Neuromuscular Re-Education Activity 5: landmine press 3x10  Balance/Neuromuscular Re-Education Activity 6: shoulder taps in modified plank pushup 3x10  Balance/Neuromuscular Re-Education Activity 7:  carry 3 " laps  Balance/Neuromuscular Re-Education Activity 8: static bear crawl hold 4x15s         Patient's spiritual, cultural, and educational needs considered and patient agreeable to plan of care and goals.     Assessment & Plan   Assessment: 2/10 pain with shoulder flexion abolished following stability intervention. Occasional cueing for shoulder positioning required. Progress home program with closed chain intervention.  Evaluation/Treatment Tolerance: Patient tolerated treatment well        Plan: Progress upper quarter stability    Goals:   Active       Physical Therapy       Physical Therapy Goal       Start:  01/24/25    Expected End:  03/07/25       GOALS:     Short Term Goals:  4 weeks Progress      1. Patient will report decreased pain to <3/10 at worst on VAS to progress toward Prior Level of Function. Progressing, not met  2. Patient will demonstrate pain free AROM in order to progress towards prior level of function.  Progressing, not met  3. Patient will improve all strength testing to 4/5 in order to progress towards Prior Level of Function.  Progressing, not met     Long Term Goals:  8 weeks Progress     1. Patient will report decreased pain to <0/10 at worst on VAS to progress toward Prior Level of Function.   Progressing, not met  2. Patient will demonstrate at least 95% LSI with HHD for scaption, flexion, and abduction in order to progress towards prior level of function.  Progressing, not met  3. Patient will improve ROM and Functional Mobility to stated goals to return to Prior Level of Function. Progressing, not met  4. Patient will perform regular work and school activities without shoulder instability or pain in order to return to prior level of function.  Progressing, not met

## 2025-02-17 ENCOUNTER — PATIENT MESSAGE (OUTPATIENT)
Dept: OTOLARYNGOLOGY | Facility: CLINIC | Age: 20
End: 2025-02-17
Payer: COMMERCIAL

## (undated) DEVICE — GLOVE SURGICAL LATEX SZ 7

## (undated) DEVICE — SEE L#120831

## (undated) DEVICE — SUCTION COAGULATOR 10FR 6IN

## (undated) DEVICE — LABEL FOR UTILITY MARKER

## (undated) DEVICE — ELECTRODE REM PLYHSV RETURN 9

## (undated) DEVICE — COTTONBALL LG ST

## (undated) DEVICE — CUP MEDICINE STERILE 2OZ

## (undated) DEVICE — SYR 3CC LUER LOC

## (undated) DEVICE — BLADE SPEAR TIP BEAVER 45DEG

## (undated) DEVICE — CATH ALL PUR URTHL RR 10FR

## (undated) DEVICE — SPONGE GAUZE 16PLY 4X4

## (undated) DEVICE — SEE MEDLINE ITEM 146292

## (undated) DEVICE — SEE MEDLINE ITEM 146347

## (undated) DEVICE — SEE MEDLINE ITEM 157125

## (undated) DEVICE — KIT ANTIFOG

## (undated) DEVICE — SEE MEDLINE ITEM 152622

## (undated) DEVICE — MARKER SKIN STND TIP BLUE BARR

## (undated) DEVICE — SEE MEDLINE ITEM 146313

## (undated) DEVICE — SPONGE DERMACEA 4X4IN 12PLY

## (undated) DEVICE — NEPTUNE 4 PORT MANIFOLD

## (undated) DEVICE — CATH IV OPTIVA OCRILON

## (undated) DEVICE — SOL NACL 0.9% INJ 500ML BG